# Patient Record
Sex: MALE | Race: WHITE | NOT HISPANIC OR LATINO | Employment: FULL TIME | URBAN - NONMETROPOLITAN AREA
[De-identification: names, ages, dates, MRNs, and addresses within clinical notes are randomized per-mention and may not be internally consistent; named-entity substitution may affect disease eponyms.]

---

## 2024-02-29 ENCOUNTER — OFFICE VISIT (OUTPATIENT)
Dept: SURGERY | Facility: CLINIC | Age: 44
End: 2024-02-29
Payer: COMMERCIAL

## 2024-02-29 VITALS
BODY MASS INDEX: 31.28 KG/M2 | HEART RATE: 80 BPM | DIASTOLIC BLOOD PRESSURE: 99 MMHG | WEIGHT: 206.4 LBS | HEIGHT: 68 IN | SYSTOLIC BLOOD PRESSURE: 135 MMHG

## 2024-02-29 DIAGNOSIS — R19.00 ABDOMINAL WALL BULGE: Primary | ICD-10-CM

## 2024-02-29 PROCEDURE — 99203 OFFICE O/P NEW LOW 30 MIN: CPT | Performed by: SURGERY

## 2024-02-29 RX ORDER — BUPROPION HYDROCHLORIDE 75 MG/1
75 TABLET ORAL 2 TIMES DAILY
COMMUNITY

## 2024-02-29 RX ORDER — BUPRENORPHINE HYDROCHLORIDE AND NALOXONE HYDROCHLORIDE DIHYDRATE 8; 2 MG/1; MG/1
1 TABLET SUBLINGUAL DAILY
COMMUNITY

## 2024-02-29 NOTE — PROGRESS NOTES
"Date of Service: 2/29/2024    Subjective   Miguel Orta is a 43 y.o. male is being seen for consultation for abdominal bulge today at the request of Александр Bender    Chief complaint: Abdominal bulge    Miguel Orta is a 43 y.o.  male nicotine user who presents to my clinic with several weeks of supraumbilical abdominal bulge that he first noticed while doing sit ups.  He has lost roughly 15 pounds intentionally recently.  He reports some mild soreness with heavy lifting but not severe.  Denies nausea or vomiting.  No prior abdominal surgeries    Past Medical History:   Diagnosis Date    Hernia, ventral     Hypertension     Umbilical hernia        Past Surgical History:   Procedure Laterality Date    FACIAL FRACTURE SURGERY  2018    FRACTURE SURGERY      bilateral arms    NECK SURGERY      cyst    TONSILLECTOMY           Family History   Problem Relation Age of Onset    Hypertension Mother     Cancer Mother         skin cancer    Diabetes Father     Lung disease Father         black lung         Social History     Socioeconomic History    Marital status: Unknown   Tobacco Use    Smoking status: Former     Types: Cigarettes     Passive exposure: Current    Smokeless tobacco: Current   Vaping Use    Vaping Use: Every day    Substances: Nicotine    Passive vaping exposure: Yes   Substance and Sexual Activity    Alcohol use: Yes     Comment: 1 beer per day    Drug use: Not Currently     Types: Methamphetamines, Opium, 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  Constitution: /99   Pulse 80   Ht 172.7 cm (68\")   Wt 93.6 kg (206 lb 6.4 oz)   BMI 31.38 kg/m²  . No acute distress   Head: Normocephalic, atraumatic.   Eyes: Aligned without " strabismus. Conjunctivae noninjected   Ears, Nose, Mouth: , no lesions appreciated   Respiratory: Symmetric chest expansion. No respiratory distress.   Gastrointestinal:  Soft, nondistended, nontender.  Likely supraumbilical diastases recti.  Tiny subcentimeter umbilical hernia  Skin:  No cyanosis, clubbing or edema bilaterally    Lymphatics: No abnormal cervical or supraclavicular adenopathy  Neurologic: No gross deficits.  Alert and oriented x3  Psychiatric: Normal mood              Miguel Orta is a 43 y.o.  male nicotine user with likely diastases recti and tiny umbilical hernia    I will obtain a noncontrasted CT abdomen pelvis to evaluate his abdominal wall.  I feel the bulge likely represents a diastases, rather than hernia.  Tiny asymptomatic umbilical hernia can be watched    BMI is >= 30 and <35. (Class 1 Obesity). The following options were offered after discussion;: information on healthy weight added to patient's after visit summary             Allan Ragsdale MD  [facility]

## 2024-03-01 ENCOUNTER — TELEPHONE (OUTPATIENT)
Dept: SURGERY | Facility: CLINIC | Age: 44
End: 2024-03-01
Payer: COMMERCIAL

## 2024-03-01 NOTE — TELEPHONE ENCOUNTER
SCHEDULING LETTER PLACED IN OUTGOING MAIL WITH THE FOLLOWING INFORMATION:    CT ABD / PELVIS:  MON 03/11/24 ARRIVE AT 9:15 AM AT [facility] LOCATED AT [address] [place] [address] [place]. [address]  NOTHING TO EAT OR DRINK 6 HOURS PRIOR TO THIS SCAN.    FOLLOW UP WITH DR. ALVAREZ HAS BEEN SCHEDULE AT THE [facility] ON  WED 03/13/24 ARRIVE AT 11:30 AM AT [facility] LOCATED AT [address] [place] [address] [place]. [address]    IF FOR ANY REASON YOU NEED TO RESCHEDULE THESE APPOINTMENTS

## 2024-04-04 ENCOUNTER — TELEPHONE (OUTPATIENT)
Dept: SURGERY | Facility: CLINIC | Age: 44
End: 2024-04-04
Payer: COMMERCIAL

## 2024-04-04 NOTE — TELEPHONE ENCOUNTER
ATTEMPTED TO REACH PATIENT TO RESCHEDULE CT SCAN THAT WAS SCHEDULED ON 03/11/24 AND PATIENT FAILED TO ATTEND.  PHONE NUMBER ON CHART IS NOT A WORKING NUMBER AND NO OTHER CONTACT NUMBERS ARE AVAILABLE.    I WILL SEND OUT LETTER REQUESTING PATIENT TO CONTACT OFFICE TO RESCHEDULE CT SCAN AND FOLLOW UP WITH DR. ALVAREZ.